# Patient Record
Sex: FEMALE | Race: WHITE | NOT HISPANIC OR LATINO | URBAN - METROPOLITAN AREA
[De-identification: names, ages, dates, MRNs, and addresses within clinical notes are randomized per-mention and may not be internally consistent; named-entity substitution may affect disease eponyms.]

---

## 2022-02-15 ENCOUNTER — OFFICE (OUTPATIENT)
Dept: URBAN - METROPOLITAN AREA CLINIC 11 | Facility: CLINIC | Age: 42
End: 2022-02-15

## 2022-02-15 VITALS
WEIGHT: 150 LBS | OXYGEN SATURATION: 98 % | DIASTOLIC BLOOD PRESSURE: 78 MMHG | HEIGHT: 66 IN | SYSTOLIC BLOOD PRESSURE: 116 MMHG

## 2022-02-15 DIAGNOSIS — K58.9 IRRITABLE BOWEL SYNDROME WITHOUT DIARRHEA: ICD-10-CM

## 2022-02-15 PROCEDURE — 99203 OFFICE O/P NEW LOW 30 MIN: CPT | Performed by: INTERNAL MEDICINE

## 2022-02-15 NOTE — SERVICEHPINOTES
42-year-old female referred for consideration of colonoscopy but actually her family history is that of and uncle only with colon cancer and she is not considered high risk.  Patient does have dysautonomia and is followed by a cardiologist and worsened in [place].  She has had some issues with alternating bowel function with some constipation problems in the past but seems to be doing relatively well right now.  She takes MiraLax on a occasion.  She has also seen occasional bright red blood on the toilet tissue primarily and associates this with a hard stool and a potential fissure related to that.  This is not common.  She eats very well and drinks plenty of water and exercises.  Her weight remains stable.  She has had hemorrhoid issues in the past also after pregnancy with twins.?   Her latest laboratory data from January of this year was all normal with hematocrit of better than 40%.

## 2022-02-24 ENCOUNTER — OFFICE (OUTPATIENT)
Dept: URBAN - METROPOLITAN AREA CLINIC 11 | Facility: CLINIC | Age: 42
End: 2022-02-24

## 2023-02-22 ENCOUNTER — OFFICE (OUTPATIENT)
Dept: URBAN - METROPOLITAN AREA CLINIC 9 | Facility: CLINIC | Age: 43
End: 2023-02-22

## 2023-02-22 VITALS
WEIGHT: 148 LBS | HEIGHT: 66 IN | HEART RATE: 59 BPM | SYSTOLIC BLOOD PRESSURE: 147 MMHG | DIASTOLIC BLOOD PRESSURE: 82 MMHG | OXYGEN SATURATION: 98 %

## 2023-02-22 DIAGNOSIS — Z80.0 FAMILY HISTORY OF MALIGNANT NEOPLASM OF DIGESTIVE ORGANS: ICD-10-CM

## 2023-02-22 DIAGNOSIS — K92.1 MELENA: ICD-10-CM

## 2023-02-22 DIAGNOSIS — R19.8 OTHER SPECIFIED SYMPTOMS AND SIGNS INVOLVING THE DIGESTIVE S: ICD-10-CM

## 2023-02-22 PROCEDURE — 99214 OFFICE O/P EST MOD 30 MIN: CPT | Performed by: SPECIALIST

## 2023-04-27 ENCOUNTER — AMBULATORY SURGICAL CENTER (OUTPATIENT)
Dept: URBAN - METROPOLITAN AREA SURGERY 3 | Facility: SURGERY | Age: 43
End: 2023-04-27

## 2023-04-27 VITALS
TEMPERATURE: 98.7 F | HEART RATE: 93 BPM | RESPIRATION RATE: 20 BRPM | RESPIRATION RATE: 18 BRPM | OXYGEN SATURATION: 97 % | DIASTOLIC BLOOD PRESSURE: 65 MMHG | RESPIRATION RATE: 21 BRPM | OXYGEN SATURATION: 98 % | SYSTOLIC BLOOD PRESSURE: 94 MMHG | RESPIRATION RATE: 23 BRPM | SYSTOLIC BLOOD PRESSURE: 126 MMHG | HEART RATE: 72 BPM | OXYGEN SATURATION: 98 % | SYSTOLIC BLOOD PRESSURE: 104 MMHG | WEIGHT: 148 LBS | WEIGHT: 148 LBS | DIASTOLIC BLOOD PRESSURE: 67 MMHG | DIASTOLIC BLOOD PRESSURE: 64 MMHG | DIASTOLIC BLOOD PRESSURE: 70 MMHG | RESPIRATION RATE: 21 BRPM | SYSTOLIC BLOOD PRESSURE: 137 MMHG | HEART RATE: 70 BPM | RESPIRATION RATE: 21 BRPM | SYSTOLIC BLOOD PRESSURE: 106 MMHG | HEART RATE: 95 BPM | RESPIRATION RATE: 23 BRPM | RESPIRATION RATE: 18 BRPM | RESPIRATION RATE: 16 BRPM | RESPIRATION RATE: 16 BRPM | HEART RATE: 70 BPM | RESPIRATION RATE: 23 BRPM | SYSTOLIC BLOOD PRESSURE: 106 MMHG | RESPIRATION RATE: 16 BRPM | SYSTOLIC BLOOD PRESSURE: 111 MMHG | HEART RATE: 93 BPM | TEMPERATURE: 98.1 F | OXYGEN SATURATION: 99 % | SYSTOLIC BLOOD PRESSURE: 137 MMHG | OXYGEN SATURATION: 99 % | DIASTOLIC BLOOD PRESSURE: 64 MMHG | RESPIRATION RATE: 18 BRPM | SYSTOLIC BLOOD PRESSURE: 94 MMHG | SYSTOLIC BLOOD PRESSURE: 94 MMHG | HEART RATE: 95 BPM | HEART RATE: 76 BPM | DIASTOLIC BLOOD PRESSURE: 85 MMHG | DIASTOLIC BLOOD PRESSURE: 94 MMHG | TEMPERATURE: 98.7 F | OXYGEN SATURATION: 100 % | OXYGEN SATURATION: 97 % | HEIGHT: 66 IN | DIASTOLIC BLOOD PRESSURE: 94 MMHG | HEART RATE: 67 BPM | OXYGEN SATURATION: 100 % | DIASTOLIC BLOOD PRESSURE: 69 MMHG | HEART RATE: 93 BPM | DIASTOLIC BLOOD PRESSURE: 69 MMHG | DIASTOLIC BLOOD PRESSURE: 85 MMHG | RESPIRATION RATE: 20 BRPM | SYSTOLIC BLOOD PRESSURE: 126 MMHG | OXYGEN SATURATION: 100 % | TEMPERATURE: 98.1 F | HEART RATE: 67 BPM | DIASTOLIC BLOOD PRESSURE: 64 MMHG | OXYGEN SATURATION: 97 % | SYSTOLIC BLOOD PRESSURE: 126 MMHG | HEART RATE: 67 BPM | SYSTOLIC BLOOD PRESSURE: 106 MMHG | TEMPERATURE: 98.7 F | SYSTOLIC BLOOD PRESSURE: 119 MMHG | HEART RATE: 76 BPM | DIASTOLIC BLOOD PRESSURE: 69 MMHG | HEART RATE: 72 BPM | DIASTOLIC BLOOD PRESSURE: 94 MMHG | HEART RATE: 70 BPM | HEART RATE: 95 BPM | SYSTOLIC BLOOD PRESSURE: 119 MMHG | SYSTOLIC BLOOD PRESSURE: 104 MMHG | HEART RATE: 62 BPM | HEIGHT: 66 IN | HEART RATE: 62 BPM | HEART RATE: 76 BPM | DIASTOLIC BLOOD PRESSURE: 67 MMHG | RESPIRATION RATE: 20 BRPM | DIASTOLIC BLOOD PRESSURE: 70 MMHG | SYSTOLIC BLOOD PRESSURE: 137 MMHG | SYSTOLIC BLOOD PRESSURE: 104 MMHG | HEIGHT: 66 IN | DIASTOLIC BLOOD PRESSURE: 85 MMHG | SYSTOLIC BLOOD PRESSURE: 111 MMHG | DIASTOLIC BLOOD PRESSURE: 65 MMHG | SYSTOLIC BLOOD PRESSURE: 119 MMHG | HEART RATE: 62 BPM | DIASTOLIC BLOOD PRESSURE: 65 MMHG | SYSTOLIC BLOOD PRESSURE: 111 MMHG | WEIGHT: 148 LBS | OXYGEN SATURATION: 99 % | TEMPERATURE: 98.1 F | DIASTOLIC BLOOD PRESSURE: 67 MMHG | HEART RATE: 72 BPM | OXYGEN SATURATION: 98 % | DIASTOLIC BLOOD PRESSURE: 70 MMHG

## 2023-04-27 DIAGNOSIS — K64.8 OTHER HEMORRHOIDS: ICD-10-CM

## 2023-04-27 DIAGNOSIS — K92.1 MELENA: ICD-10-CM

## 2023-04-27 PROCEDURE — 45378 DIAGNOSTIC COLONOSCOPY: CPT | Performed by: SPECIALIST

## 2025-01-21 ENCOUNTER — OFFICE (OUTPATIENT)
Dept: URBAN - METROPOLITAN AREA CLINIC 11 | Facility: CLINIC | Age: 45
End: 2025-01-21
Payer: COMMERCIAL

## 2025-01-21 VITALS
OXYGEN SATURATION: 98 % | SYSTOLIC BLOOD PRESSURE: 135 MMHG | HEART RATE: 67 BPM | HEIGHT: 66 IN | WEIGHT: 158 LBS | DIASTOLIC BLOOD PRESSURE: 80 MMHG

## 2025-01-21 DIAGNOSIS — R19.4 CHANGE IN BOWEL HABIT: ICD-10-CM

## 2025-01-21 PROCEDURE — 99214 OFFICE O/P EST MOD 30 MIN: CPT | Performed by: STUDENT IN AN ORGANIZED HEALTH CARE EDUCATION/TRAINING PROGRAM

## 2025-01-21 NOTE — SERVICEHPINOTES
Shayy Clark   is a   44   year old  female   with history of anxiety, dysautonomia, pots, and IBS here today with complaints of a rash on her face, as well as alternating diarrhea and constipation. 
br
br    She states for the past year she has been struggling with a rash on her face.  She believed it was related to her diet and saw a nutritionist.  The nutritionist performed stool studies under a test called a "GI spotlight" and she was told that she had staph and strep within her GI tract.  She was prescribed supplements to help with this without much relief.  She does also complain issues with alternating diarrhea and constipation.  She states the constipation typically occurs when she eats too much dairy.  If she abstains from dairy, she does not have any issues.  She does also complain of some loose stools if she goes on a long or hard run, but other than that reports regular formed bowel movements without bleeding or straining.  She denies any reflux symptoms, nausea, vomiting, abdominal pain, hematochezia, melena, or unexplained weight loss.  Of note she does have history of anorexia and bulimia, but has recovered from that 7 years ago. She is wondering if her rash could be stress induced, but she would like to rule out other possible etiologies such as bacterial overgrowth.  Her last colonoscopy was in 2023 and showed a normal mucosa in the whole colon and terminal ileum and internal hemorrhoids.  She was placed on 10 year recall and is due in 2033.

## 2025-01-21 NOTE — SERVICENOTES
She actually is not having any GI symptoms at this time.  She is more concerned about the rash on her face, and would like to rule out any underlying GI etiology such as H pylori or SIBO.  I did discuss with her at length.  We will proceed with testing and proceed based on results.  Advised to call for new or worsening symptoms, and can proceed with further testing at that time if needed.  She verbalized understanding.

## 2025-01-24 LAB — H PYLORI BREATH TEST: (no result)
